# Patient Record
Sex: MALE | Race: WHITE | NOT HISPANIC OR LATINO | URBAN - METROPOLITAN AREA
[De-identification: names, ages, dates, MRNs, and addresses within clinical notes are randomized per-mention and may not be internally consistent; named-entity substitution may affect disease eponyms.]

---

## 2024-11-20 ENCOUNTER — LAB OUTSIDE AN ENCOUNTER (OUTPATIENT)
Dept: URBAN - METROPOLITAN AREA CLINIC 98 | Facility: CLINIC | Age: 24
End: 2024-11-20

## 2024-11-20 ENCOUNTER — DASHBOARD ENCOUNTERS (OUTPATIENT)
Age: 24
End: 2024-11-20

## 2024-11-20 ENCOUNTER — OFFICE VISIT (OUTPATIENT)
Dept: URBAN - METROPOLITAN AREA CLINIC 98 | Facility: CLINIC | Age: 24
End: 2024-11-20
Payer: COMMERCIAL

## 2024-11-20 VITALS
HEART RATE: 88 BPM | BODY MASS INDEX: 22.9 KG/M2 | SYSTOLIC BLOOD PRESSURE: 141 MMHG | DIASTOLIC BLOOD PRESSURE: 86 MMHG | WEIGHT: 154.6 LBS | TEMPERATURE: 91.4 F | HEIGHT: 69 IN

## 2024-11-20 DIAGNOSIS — R19.7 DIARRHEA, UNSPECIFIED TYPE: ICD-10-CM

## 2024-11-20 DIAGNOSIS — Z83.79 FAMILY HISTORY OF CROHN'S DISEASE: ICD-10-CM

## 2024-11-20 PROBLEM — 160386006: Status: ACTIVE | Noted: 2024-11-20

## 2024-11-20 PROBLEM — 62315008: Status: ACTIVE | Noted: 2024-11-20

## 2024-11-20 PROCEDURE — 99204 OFFICE O/P NEW MOD 45 MIN: CPT | Performed by: INTERNAL MEDICINE

## 2024-11-20 NOTE — PHYSICAL EXAM RECTAL:
normal tone, no external hemorrhoids, no masses palpable, no red blood, Tenderness on KAPIL, Internal hemorrhoids present

## 2024-11-20 NOTE — HPI-TODAY'S VISIT:
23 yo male with diarrhea, unexplained. Loose stools for 3 weeks. No recall of travel or going out to eat at that time. Went to [employer], E coli outbreak???  Syx have been progressive though better in last few days. Mother with UC.  1-3 loose stools a day. Up to 5-6 times a day. Not taken anything. Has not talken anything for it. No abd pain but does have "funny feeling" in abdomen.

## 2024-11-21 LAB
ALBUMIN: 4.9
ALKALINE PHOSPHATASE: 81
ALT (SGPT): 53
AST (SGOT): 39
BASO (ABSOLUTE): 0.1
BASOS: 1
BILIRUBIN, TOTAL: 0.5
BUN/CREATININE RATIO: 14
BUN: 16
C-REACTIVE PROTEIN, QUANT: <1
CALCIUM: 10.1
CARBON DIOXIDE, TOTAL: 24
CHLORIDE: 101
CREATININE: 1.17
EGFR: 88
EOS (ABSOLUTE): 0.1
EOS: 2
FERRITIN, SERUM: 139
FOLATE (FOLIC ACID), SERUM: 7.9
GLOBULIN, TOTAL: 2.6
GLUCOSE: 91
HEMATOCRIT: 49.4
HEMATOLOGY COMMENTS:: (no result)
HEMOGLOBIN: 16.3
IMMATURE CELLS: (no result)
IMMATURE GRANS (ABS): 0
IMMATURE GRANULOCYTES: 0
IRON BIND.CAP.(TIBC): 274
IRON SATURATION: 40
IRON: 109
LYMPHS (ABSOLUTE): 2.5
LYMPHS: 35
MCH: 28.6
MCHC: 33
MCV: 87
MONOCYTES(ABSOLUTE): 0.5
MONOCYTES: 8
NEUTROPHILS (ABSOLUTE): 3.9
NEUTROPHILS: 54
NRBC: (no result)
PLATELETS: 302
POTASSIUM: 4.4
PROTEIN, TOTAL: 7.5
RBC: 5.7
RDW: 12.7
SEDIMENTATION RATE-WESTERGREN: 2
SODIUM: 141
UIBC: 165
VITAMIN B12: 938
VITAMIN D, 25-HYDROXY: 30.7
WBC: 7.1

## 2024-12-19 ENCOUNTER — OFFICE VISIT (OUTPATIENT)
Dept: URBAN - METROPOLITAN AREA SURGERY CENTER 18 | Facility: SURGERY CENTER | Age: 24
End: 2024-12-19

## 2025-02-17 ENCOUNTER — OFFICE VISIT (OUTPATIENT)
Dept: URBAN - METROPOLITAN AREA CLINIC 98 | Facility: CLINIC | Age: 25
End: 2025-02-17